# Patient Record
Sex: FEMALE | Race: WHITE | NOT HISPANIC OR LATINO | URBAN - METROPOLITAN AREA
[De-identification: names, ages, dates, MRNs, and addresses within clinical notes are randomized per-mention and may not be internally consistent; named-entity substitution may affect disease eponyms.]

---

## 2017-06-14 ENCOUNTER — EMERGENCY (EMERGENCY)
Facility: HOSPITAL | Age: 37
LOS: 0 days | Discharge: HOME | End: 2017-06-14
Attending: EMERGENCY MEDICINE | Admitting: EMERGENCY MEDICINE
Payer: MEDICAID

## 2017-06-14 VITALS
HEART RATE: 85 BPM | RESPIRATION RATE: 18 BRPM | DIASTOLIC BLOOD PRESSURE: 76 MMHG | HEIGHT: 62 IN | OXYGEN SATURATION: 100 % | SYSTOLIC BLOOD PRESSURE: 119 MMHG | WEIGHT: 169.98 LBS | TEMPERATURE: 99 F

## 2017-06-14 VITALS — WEIGHT: 169.98 LBS | HEIGHT: 62 IN
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
## 2017-06-14 DIAGNOSIS — S09.93XA UNSPECIFIED INJURY OF FACE, INITIAL ENCOUNTER: ICD-10-CM

## 2017-06-14 DIAGNOSIS — J34.89 OTHER SPECIFIED DISORDERS OF NOSE AND NASAL SINUSES: ICD-10-CM

## 2017-06-14 DIAGNOSIS — S00.33XA CONTUSION OF NOSE, INITIAL ENCOUNTER: ICD-10-CM
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
## 2017-06-14 PROCEDURE — 99282 EMERGENCY DEPT VISIT SF MDM: CPT

## 2017-06-14 NOTE — ED STATDOCS - MEDICAL DECISION MAKING DETAILS
Sx consistent with nasal fx, advised pt to ice nose 20 min/5 times a day, advised OTC NSAID 6-8 hours. Refer to plastics or ENT

## 2017-06-14 NOTE — ED STATDOCS - NS ED MD SCRIBE ATTENDING SCRIBE SECTIONS
DISPOSITION/PAST MEDICAL/SURGICAL/SOCIAL HISTORY/HISTORY OF PRESENT ILLNESS/PHYSICAL EXAM/PROGRESS NOTE/REVIEW OF SYSTEMS/RESULTS/VITAL SIGNS( Pullset)

## 2017-06-14 NOTE — ED STATDOCS - ENMT, MLM
No septal hematoma, Dried blood in the b/l nares. Nasal bridge is tenderness on the left side.   Mouth with normal mucosa  Throat has no vesicles, no oropharyngeal exudates and uvula is midline.

## 2017-06-14 NOTE — ED STATDOCS - ATTENDING CONTRIBUTION TO CARE
I, Armani Calabrese, performed the initial face to face bedside interview with this patient regarding history of present illness, review of symptoms and relevant past medical, social and family history.  I completed an independent physical examination.  I was the initial provider who evaluated this patient. I have signed out the follow up of any pending tests (i.e. labs, radiological studies) to the ACP.  I have communicated the patient’s plan of care and disposition with the ACP.  The history, relevant review of systems, past medical and surgical history, medical decision making, and physical examination was documented by the scribe in my presence and I attest to the accuracy of the documentation.

## 2017-06-14 NOTE — ED ADULT NURSE NOTE - CHPI ED SYMPTOMS NEG
no fever/no loss of consciousness/no change in level of consciousness/no numbness/no weakness/no chills/no syncope/no vomiting/no blurred vision

## 2017-06-14 NOTE — ED STATDOCS - PROGRESS NOTE DETAILS
Patient without septal hematoma, patient to be referred to ENT or plastics for follow up with instructions to ice and take tylenol or ibuprofen for pain -Gibran Solano PA-C
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
## 2017-06-21 ENCOUNTER — TRANSCRIPTION ENCOUNTER (OUTPATIENT)
Age: 37
End: 2017-06-21

## 2017-09-19 DIAGNOSIS — Z00.00 ENCOUNTER FOR GENERAL ADULT MEDICAL EXAMINATION W/OUT ABNORMAL FINDINGS: ICD-10-CM

## 2019-01-21 ENCOUNTER — TRANSCRIPTION ENCOUNTER (OUTPATIENT)
Age: 39
End: 2019-01-21

## 2019-05-16 ENCOUNTER — OUTPATIENT (OUTPATIENT)
Dept: OUTPATIENT SERVICES | Age: 39
LOS: 1 days | Discharge: HOME | End: 2019-05-16

## 2019-06-10 ENCOUNTER — APPOINTMENT (OUTPATIENT)
Dept: PEDIATRIC CARDIOLOGY | Facility: CLINIC | Age: 39
End: 2019-06-10
Payer: COMMERCIAL

## 2019-06-10 VITALS
OXYGEN SATURATION: 99 % | RESPIRATION RATE: 18 BRPM | WEIGHT: 196.43 LBS | HEIGHT: 61.81 IN | BODY MASS INDEX: 36.15 KG/M2 | HEART RATE: 74 BPM | DIASTOLIC BLOOD PRESSURE: 58 MMHG | SYSTOLIC BLOOD PRESSURE: 127 MMHG

## 2019-06-10 DIAGNOSIS — I71.9 AORTIC ANEURYSM OF UNSPECIFIED SITE, W/OUT RUPTURE: ICD-10-CM

## 2019-06-10 PROCEDURE — 99214 OFFICE O/P EST MOD 30 MIN: CPT | Mod: 25

## 2019-06-10 PROCEDURE — 93325 DOPPLER ECHO COLOR FLOW MAPG: CPT

## 2019-06-10 PROCEDURE — 93303 ECHO TRANSTHORACIC: CPT

## 2019-06-10 PROCEDURE — 93320 DOPPLER ECHO COMPLETE: CPT

## 2019-06-10 PROCEDURE — 93000 ELECTROCARDIOGRAM COMPLETE: CPT

## 2019-06-10 RX ORDER — NORETHINDRONE ACETATE AND ETHINYL ESTRADIOL 1; 20 MG/1; UG/1
1-20 TABLET ORAL
Refills: 0 | Status: ACTIVE | COMMUNITY

## 2019-06-11 NOTE — PHYSICAL EXAM
[General Appearance - Alert] : alert [General Appearance - In No Acute Distress] : in no acute distress [Facies] : the head and face were normal in appearance [Sclera] : the conjunctiva were normal [Examination Of The Oral Cavity] : mucous membranes were moist and pink [Auscultation Breath Sounds / Voice Sounds] : breath sounds clear to auscultation bilaterally [Normal Chest Appearance] : the chest was normal in appearance [Left Thoracotomy] : left thoracotomy [Unremarkable] : unremarkable [Well-Healed] : well-healed [Apical Impulse] : quiet precordium with normal apical impulse [Heart Sounds] : normal S1 and S2 [Heart Rate And Rhythm] : normal heart rate and rhythm [Heart Sounds Click] : no clicks [Arterial Pulses] : normal upper and lower extremity pulses with no pulse delay [Systolic] : systolic [Edema] : no edema [I] : a grade 1/6  [RUSB] : RUSB [Carotids] : the murmur was transmitted to the carotid arteries [Abdomen Soft] : soft [Nondistended] : nondistended [Nail Clubbing] : no clubbing  or cyanosis of the fingers [Musculoskeletal Exam: Normal Movement Of All Extremities] : normal movements of all extremities [Motor Tone] : muscle strength and tone were normal [Musculoskeletal - Swelling] : no joint swelling or joint tenderness [] : no rash [Mood] : mood and affect were appropriate for age [Demonstrated Behavior - Infant Nonreactive To Parents] : interactive [Skin Turgor] : normal turgor

## 2019-06-19 NOTE — DISCUSSION/SUMMARY
[FreeTextEntry1] : In summary, Aurelia is a 38 year old female with coarctation of the aorta, tri-commisural aortic valve who is s/p subclavian flap repair at age 4 yrs and balloon valvuloplasty of re-coarctation with stable pseudoaneurysm. \par \par She informs us that she is relocating to [place] in 3 weeks once her children have finished school. We have given her information on the nearest [facility] programs  and have asked her to let us know when she has selected where she would like to transfer her care so that we can arrange to have her records sent at her request. If she does have the MRI here, we will, of course, forward the discs of the study for their review.  She does not know what her insurance status will be when she relocates but we did recommend the following:\par \par 1. Repeat c-MRI, MRA to evaluate repair, pseudoaneurysm\par 2. Exercise stress test\par 3. Identify a PCP for annual exam, routine health needs\par 4. Weight loss program\par 5. Routine lab exam to screen for metabolic syndrome, thyroid function, etc.\par \par We will attempt to fit her in for a cardiac MRI if there is a cancellation in the next 2 weeks and will keep her updated. it has been a pleasure to care for this franky young woman. [Needs SBE Prophylaxis] : [unfilled] does not need bacterial endocarditis prophylaxis

## 2019-06-19 NOTE — CONSULT LETTER
[Name] : Name: [unfilled] [Today's Date] : [unfilled] [Dear  ___:] : Dear Dr. [unfilled]: [] : : ~~ [Today's Date:] : [unfilled] [Sincerely,] : Sincerely, [Consult] : I had the pleasure of evaluating your patient, [unfilled]. My full evaluation follows. [Consult - Multiple Provider] : Thank you very much for allowing us to participate in the care of this patient. If you have any questions, please do not hesitate to contact us. [FreeTextEntry5] : [address]  [FreeTextEntry4] : Dr. Jerson Mariscal MD [de-identified] : \par Bonita Foreman, MSN, CPNP-AC, [facility]\par Pediatric Cardiology, Adult Congenital Cardiology\par Elvia De La Fuente [facility]\par  [FreeTextEntry6] : [facility] [id number]

## 2019-06-19 NOTE — REVIEW OF SYSTEMS
[Nl] : Genitourinary [Feeling Poorly] : not feeling poorly (malaise) [Fever] : no fever [Wgt Loss (___ Lbs)] : no recent weight loss [Pallor] : not pale [Eye Discharge] : no eye discharge [Change in Vision] : no change in vision [Redness] : no redness [Sore Throat] : no sore throat [Nasal Stuffiness] : no nasal congestion [Earache] : no earache [Loss Of Hearing] : no hearing loss [Edema] : no edema [Cyanosis] : no cyanosis [Diaphoresis] : not diaphoretic [Exercise Intolerance] : no persistence of exercise intolerance [Chest Pain] : no chest pain or discomfort [Palpitations] : no palpitations [Orthopnea] : no orthopnea [Tachypnea] : not tachypneic [Fast HR] : no tachycardia [Cough] : no cough [Wheezing] : no wheezing [Shortness Of Breath] : not expressed as feeling short of breath [Diarrhea] : no diarrhea [Vomiting] : no vomiting [Abdominal Pain] : no abdominal pain [Fainting (Syncope)] : no fainting [Decrease In Appetite] : appetite not decreased [Headache] : no headache [Seizure] : no seizures [Dizziness] : no dizziness [Limping] : no limping [Joint Pains] : no arthralgias [Rash] : no rash [Joint Swelling] : no joint swelling [Easy Bruising] : no tendency for easy bruising [Swollen Glands] : no lymphadenopathy [Wound problems] : no wound problems [Easy Bleeding] : no ~M tendency for easy bleeding [Sleep Disturbances] : ~T no sleep disturbances [Nosebleeds] : no epistaxis [Hyperactive] : no hyperactive behavior [Depression] : no depression [Anxiety] : no anxiety [Failure To Thrive] : no failure to thrive [Short Stature] : short stature was not noted [Jitteriness] : no jitteriness [Heat/Cold Intolerance] : no temperature intolerance [Dec Urine Output] : no oliguria

## 2019-06-19 NOTE — HISTORY OF PRESENT ILLNESS
[FreeTextEntry1] : Aurelia Todd was evaluated at the [facility] today. Aurelia is a 38 year old woman with a history of coarctation of the aorta, s/p repair at age 4 and balloon dilation of recoarctation in the cath lab. She had been followed at the [place] for a noted pseudoaneurysm at the site of the balloon dilation of the re-coarctation site prior to her relocation to [place]. \par \par We last saw her in May 2016 following the birth of her second child. An MRI following her delivery revealed a pseudoaneurysm that had remained unchanged in size at that time.  She returns today for follow up for the first time since 2016 and informs us that she will be relocating to [place] in 3 weeks. \par \par Her interim health has remained unchanged without any symptoms. Her cardiovascular review of systems is unremarkable. Specifically, there are no complaints of chest pain, palpitations, shortness of breath, peripheral edema, dizziness or syncope. She has gained 20 pounds since her last evaluation.\par

## 2019-06-19 NOTE — CARDIOLOGY SUMMARY
[Today's Date] : [unfilled] [FreeTextEntry2] : Summary:\par 1. Coarctation of aorta status post repair by subclavian flap at 4 years of age with subsequent balloon\par dilation of recoarctation in the Cath Lab with a pseudoaneurysm at the site of balloon dilation.\par 2. Mild flow acceleration in the distal transverse arch/proximal thoracic descending aorta to 2.1 m/sec.\par Limited suprasternal acoustical windows that makes visualization of the aortic arch difficult. The\par pseudoaneurysm is not visualized.\par 3. Normal left ventricular size, morphology and systolic function.\par 4. Normal right ventricular morphology with qualitatively normal size and systolic function.\par 5. No pericardial effusion.\par 6. Limited acoustical windows. Findings limited to above. [FreeTextEntry1] : NSR, ventricular rate 78 bpm

## 2019-06-19 NOTE — CLINICAL NARRATIVE
[Up to Date] : Up to Date [FreeTextEntry2] : LEX [place] 38 year arrives for follow up s/p coarctation repair . As per patient  no Hx of symptoms referable to the cardiovascular system. \par